# Patient Record
Sex: MALE | Race: ASIAN | URBAN - METROPOLITAN AREA
[De-identification: names, ages, dates, MRNs, and addresses within clinical notes are randomized per-mention and may not be internally consistent; named-entity substitution may affect disease eponyms.]

---

## 2017-03-08 DIAGNOSIS — E11.65 TYPE 2 DIABETES MELLITUS WITH HYPERGLYCEMIA, WITHOUT LONG-TERM CURRENT USE OF INSULIN (HCC): ICD-10-CM

## 2017-03-08 DIAGNOSIS — Z11.59 NEED FOR HEPATITIS C SCREENING TEST: ICD-10-CM

## 2017-03-09 LAB
EST. AVERAGE GLUCOSE BLD GHB EST-MCNC: 126 MG/DL
HBA1C MFR BLD: 6 % (ref 4.8–5.6)
HCV AB S/CO SERPL IA: <0.1 S/CO RATIO (ref 0–0.9)

## 2017-03-09 NOTE — PROGRESS NOTES
Your diabetes test is much improved. Your hepatitis C was normal.     Great job! Please eat healthy   and stay active at least 30 minutes a day. I will see you on March 14th.

## 2017-03-14 ENCOUNTER — OFFICE VISIT (OUTPATIENT)
Dept: INTERNAL MEDICINE CLINIC | Age: 62
End: 2017-03-14

## 2017-03-14 VITALS
BODY MASS INDEX: 23.18 KG/M2 | DIASTOLIC BLOOD PRESSURE: 78 MMHG | SYSTOLIC BLOOD PRESSURE: 116 MMHG | HEART RATE: 68 BPM | WEIGHT: 165.6 LBS | HEIGHT: 71 IN | OXYGEN SATURATION: 97 % | TEMPERATURE: 98 F | RESPIRATION RATE: 16 BRPM

## 2017-03-14 DIAGNOSIS — R07.89 MUSCULOSKELETAL CHEST PAIN: ICD-10-CM

## 2017-03-14 DIAGNOSIS — R79.89 ABNORMAL TSH: ICD-10-CM

## 2017-03-14 DIAGNOSIS — R20.2 NUMBNESS AND TINGLING OF RIGHT LEG: ICD-10-CM

## 2017-03-14 DIAGNOSIS — L85.3 DRY SKIN: ICD-10-CM

## 2017-03-14 DIAGNOSIS — R20.0 NUMBNESS AND TINGLING OF RIGHT LEG: ICD-10-CM

## 2017-03-14 DIAGNOSIS — E11.65 TYPE 2 DIABETES MELLITUS WITH HYPERGLYCEMIA, WITHOUT LONG-TERM CURRENT USE OF INSULIN (HCC): Primary | ICD-10-CM

## 2017-03-14 LAB
ALBUMIN UR QL STRIP: NORMAL MG/L
CREATININE, URINE POC: NORMAL MG/DL
MICROALBUMIN/CREAT RATIO POC: NORMAL MG/G

## 2017-03-14 RX ORDER — ATORVASTATIN CALCIUM 10 MG/1
10 TABLET, FILM COATED ORAL DAILY
Qty: 30 TAB | Refills: 6 | Status: SHIPPED | OUTPATIENT
Start: 2017-07-24 | End: 2017-11-07 | Stop reason: SDDI

## 2017-03-14 RX ORDER — METFORMIN HYDROCHLORIDE 500 MG/1
500 TABLET ORAL 2 TIMES DAILY WITH MEALS
Qty: 120 TAB | Refills: 6 | Status: SHIPPED | OUTPATIENT
Start: 2017-03-14 | End: 2017-11-07 | Stop reason: SDUPTHER

## 2017-03-14 NOTE — PROGRESS NOTES
Chief Complaint   Patient presents with    Other     lab results and states that he has lost weight and is feeling very tired.

## 2017-03-14 NOTE — MR AVS SNAPSHOT
Visit Information Date & Time Provider Department Dept. Phone Encounter #  
 3/14/2017  9:30 AM Daksha Dodson MD [facility] Internal Medicine [phone] [id number] Follow-up Instructions Return if symptoms worsen or fail to improve, for Chronic medical problems follow up. Upcoming Health Maintenance Date Due  
 FOOT EXAM Q1 11/9/1965 MICROALBUMIN Q1 11/9/1965 EYE EXAM RETINAL OR DILATED Q1 11/9/1965 HEMOGLOBIN A1C Q6M 9/8/2017 LIPID PANEL Q1 12/6/2017 FOBT Q 1 YEAR AGE 50-75 12/6/2017 DTaP/Tdap/Td series (2 - Td) 12/6/2026 Allergies as of 3/14/2017  Review Complete On: 3/14/2017 By: Esteban Urbina LPN No Known Allergies Current Immunizations  Never Reviewed No immunizations on file. Not reviewed this visit You Were Diagnosed With   
  
 Codes Comments Type 2 diabetes mellitus with hyperglycemia, without long-term current use of insulin (HCC)    -  Primary ICD-10-CM: E11.65 ICD-9-CM: 250.00, 790.29 Numbness and tingling of right leg     ICD-10-CM: R20.2 ICD-9-CM: 782.0 Abnormal TSH     ICD-10-CM: R79.89 ICD-9-CM: 790.6 Dry skin     ICD-10-CM: L85.3 ICD-9-CM: 701.1 Vitals BP Pulse Temp Resp Height(growth percentile) Weight(growth percentile) 116/78 (BP 1 Location: Left arm, BP Patient Position: Sitting) 68 98 °F (36.7 °C) (Oral) 16 5' 11\" (1.803 m) 165 lb 9.6 oz (75.1 kg) SpO2 BMI Smoking Status 97% 23.1 kg/m2 Never Smoker BMI and BSA Data Body Mass Index Body Surface Area  
 23.1 kg/m 2 1.94 m 2 Preferred Pharmacy Pharmacy Name Phone [facility] [address] , [place] [address] [phone] Your Updated Medication List  
  
   
This list is accurate as of: 3/14/17 10:14 AM.  Always use your most recent med list.  
  
  
  
  
 cholecalciferol 1,000 unit tablet Commonly known as:  VITAMIN D3 Take 1 Tab by mouth as needed. metFORMIN 500 mg tablet Commonly known as:  GLUCOPHAGE Take 1 Tab by mouth two (2) times daily (with meals). Indications: type 2 diabetes mellitus  
  
 multivitamin tablet Commonly known as:  ONE A DAY Take 1 Tab by mouth as needed. We Performed the Following  DIABETES FOOT EXAM [7 Custom] MICROALBUMIN, UR, RAND W/ MICROALBUMIN/CREA RATIO [id number] CPT(R)] REFERRAL TO OPHTHALMOLOGY [REF57 Custom] Comments:  
 Diabetic eye exam  
 TSH REFLEX TO T4 [[id number] Custom] Follow-up Instructions Return if symptoms worsen or fail to improve, for Chronic medical problems follow up. To-Do List   
 03/14/2017 Lab:  CBC W/O DIFF   
  
 03/14/2017 Lab:  HEMOGLOBIN A1C WITH EAG   
  
 03/14/2017 Lab:  LIPID PANEL   
  
 03/14/2017 Lab:  METABOLIC PANEL, COMPREHENSIVE Referral Information Referral ID Referred By Referred To  
  
 [id number] Fritz Guerra Not Available Visits Status Start Date End Date 1 New Request 3/14/17 3/14/18 If your referral has a status of pending review or denied, additional information will be sent to support the outcome of this decision. Patient Instructions Statins: Care Instructions Your Care Instructions Statins are medicines that lower your cholesterol and your risk for a heart attack and stroke. Cholesterol is a type of fat in your blood. If you have too much cholesterol, it can build up in blood vessels. This raises your risk of heart disease, heart attack, and stroke. Statins lower cholesterol by blocking how much cholesterol your body makes. This prevents cholesterol from building up in your blood vessels. This is called hardening of the arteries. It is the starting point for some heart and blood flow problems, such as heart disease. Statins may also reduce inflammation around the buildup (called plaque).  This can lower the risk that the plaque will break apart and lead to a heart attack or stroke. A heart-healthy lifestyle is important for lowering your risk whether you take statins or not. This includes eating healthy foods, being active, staying at a healthy weight, and not smoking. You must take statins regularly for them to work well. If you stop, your cholesterol and your risk will go back up. Examples of statins include: · Atorvastatin (Lipitor). · Lovastatin (Mevacor). · Pravastatin (Pravachol). · Simvastatin (Zocor). Statins interact with many medicines. So tell your doctor all of the other medicines that you take. These include prescription medicines, over-the-counter medicines, dietary supplements, and herbal products. Follow-up care is a key part of your treatment and safety. Be sure to make and go to all appointments, and call your doctor if you are having problems. It's also a good idea to know your test results and keep a list of the medicines you take. How can you care for yourself at home? · Take statins exactly as your doctor tells you. High cholesterol has no symptoms. So it is easy to forget to take the pills. Try to make a system that reminds you to take them. · Do not take two or more medicines at the same time unless the doctor told you to. Statins can interact with other medicines. · Always tell your doctor if you think you are having a side effect. If side effects are a problem with one medicine, a different one may be used. · Keep making the lifestyle changes your doctor suggests. Eat heart-healthy foods, be active, don't smoke, and stay at a healthy weight. · Talk to your doctor about avoiding grapefruit juice if you take statins. Grapefruit juice can raise the level of this medicine in your blood. This could increase side effects. When should you call for help? Watch closely for changes in your health, and be sure to contact your doctor if: 
· You have side effects of statins. These include: ¨ Fatigue. ¨ Upset stomach. ¨ Gas. ¨ Constipation. ¨ Pain or cramps in the belly. ¨ Muscle aches. · You have any new symptoms or side effects. Where can you learn more? Go to http://madonna-nazario.info/. Enter R358 in the search box to learn more about \"Statins: Care Instructions. \" Current as of: June 30, 2016 Content Version: 11.1 © 5674-6434 [employer]. Care instructions adapted under license by [employer] (which disclaims liability or warranty for this information). If you have questions about a medical condition or this instruction, always ask your healthcare professional. [place] [address] any warranty or liability for your use of this information. Neuropathic Pain: Care Instructions Your Care Instructions Neuropathic pain is caused by pressure on or damage to your nerves. It's often simply called nerve pain. Some people feel this type of pain all the time. For others, it comes and goes. Diabetes, shingles, or an injury can cause nerve pain. Many people say the pain feels sharp, burning, or stabbing. But some people feel it as a dull ache. In some cases, it makes your skin very sensitive. So touch, pressure, and other sensations that did not hurt before may now cause pain. It's important to know that this kind of pain is real and can affect your quality of life. It's also important to know that treatment can help. Treatment includes pain medicines, exercise, and physical therapy. Medicines can help reduce the number of pain signals that travel over the nerves. This can make the painful areas less sensitive. It can also help you sleep better and improve your mood. But medicines are only one part of successful treatment. Most people do best with more than one kind of treatment. Your doctor may recommend that you try cognitive-behavioral therapy and stress management.  Or, if needed, you may decide to try to quit smoking, lower your blood pressure, or better control blood sugar. These kinds of healthy changes can also make a difference. If you feel that your treatment is not working, talk to your doctor. And be sure to tell your doctor if you think you might be depressed or anxious. These are common problems that can also be treated. Follow-up care is a key part of your treatment and safety. Be sure to make and go to all appointments, and call your doctor if you are having problems. It's also a good idea to know your test results and keep a list of the medicines you take. How can you care for yourself at home? · Be safe with medicines. Read and follow all instructions on the label. ¨ If the doctor gave you a prescription medicine for pain, take it as prescribed. ¨ If you are not taking a prescription pain medicine, ask your doctor if you can take an over-the-counter medicine. · Save hard tasks for days when you have less pain. Follow a hard task with an easy task. And remember to take breaks. · Relax, and reduce stress. You may want to try deep breathing or meditation. These can help. · Keep moving. Gentle, daily exercise can help reduce pain. Your doctor or physical therapist can tell you what type of exercise is best for you. This may include walking, swimming, and stationary biking. It may also include stretches and range-of-motion exercises. · Try heat, cold packs, and massage. · Get enough sleep. Constant pain can make you more tired. If the pain makes it hard to sleep, talk with your doctor. · Think positively. Your thoughts can affect your pain. Do fun things to distract yourself from the pain. See a movie, read a book, listen to music, or spend time with a friend. · Keep a pain diary. Try to write down how strong your pain is and what it feels like. Also try to notice and write down how your moods, thoughts, sleep, activities, and medicine affect your pain.  These notes can help you and your doctor find the best ways to treat your pain. Reducing constipation caused by pain medicine Pain medicines often cause constipation. To reduce constipation: 
· Include fruits, vegetables, beans, and whole grains in your diet each day. These foods are high in fiber. · Drink plenty of fluids, enough so that your urine is light yellow or clear like water. If you have kidney, heart, or liver disease and have to limit fluids, talk with your doctor before you increase the amount of fluids you drink. · Get some exercise every day. Build up slowly to 30 to 60 minutes a day on 5 or more days of the week. · Take a fiber supplement, such as Citrucel or Metamucil, every day if needed. Read and follow all instructions on the label. · Schedule time each day for a bowel movement. Having a daily routine may help. Take your time and do not strain when having a bowel movement. · Ask your doctor about a laxative. The goal is to have one easy bowel movement every 1 to 2 days. Do not let constipation go untreated for more than 3 days. When should you call for help? Call your doctor now or seek immediate medical care if: 
· You feel sad, anxious, or hopeless for more than a few days. This could mean you are depressed. Depression is common in people who have a lot of pain. But it can be treated. · You have trouble with bowel movements, such as: 
¨ No bowel movement in 3 days. ¨ Blood in the anal area, in your stool, or on the toilet paper. ¨ Diarrhea for more than 24 hours. Watch closely for changes in your health, and be sure to contact your doctor if: 
· Your pain is getting worse. · You can't sleep because of pain. · You are very worried or anxious about your pain. · You have trouble taking your pain medicine. · You have any concerns about your pain medicine or its side effects. · You have vomiting or cramps for more than 2 hours. Where can you learn more? Go to http://madonna-nazario.info/. Enter C573 in the search box to learn more about \"Neuropathic Pain: Care Instructions. \" Current as of: February 19, 2016 Content Version: 11.1 © 0623-3499 [employer]. Care instructions adapted under license by [employer] (which disclaims liability or warranty for this information). If you have questions about a medical condition or this instruction, always ask your healthcare professional. [address] any warranty or liability for your use of this information. Diabetes Foot Health: Care Instructions Your Care Instructions When you have diabetes, your feet need extra care and attention. Diabetes can damage the nerve endings and blood vessels in your feet, making you less likely to notice when your feet are injured. Diabetes also limits your body's ability to fight infection and get blood to areas that need it. If you get a minor foot injury, it could become an ulcer or a serious infection. With good foot care, you can prevent most of these problems. Caring for your feet can be quick and easy. Most of the care can be done when you are bathing or getting ready for bed. Follow-up care is a key part of your treatment and safety. Be sure to make and go to all appointments, and call your doctor if you are having problems. Its also a good idea to know your test results and keep a list of the medicines you take. How can you care for yourself at home? · Keep your blood sugar close to normal by watching what and how much you eat, monitoring blood sugar, taking medicines if prescribed, and getting regular exercise. · Do not smoke. Smoking affects blood flow and can make foot problems worse. If you need help quitting, talk to your doctor about stop-smoking programs and medicines. These can increase your chances of quitting for good. · Eat a diet that is low in fats.  High fat intake can cause fat to build up in your blood vessels and decrease blood flow. · Inspect your feet daily for blisters, cuts, cracks, or sores. If you cannot see well, use a mirror or have someone help you. · Take care of your feet: 
[facility] your feet every day. Use warm (not hot) water. Check the water temperature with your wrists or other part of your body, not your feet. ¨ Dry your feet well. Pat them dry. Do not rub the skin on your feet too hard. Dry well between your toes. If the skin on your feet stays moist, bacteria or a fungus can grow, which can lead to infection. ¨ Keep your skin soft. Use moisturizing skin cream to keep the skin on your feet soft and prevent calluses and cracks. But do not put the cream between your toes, and stop using any cream that causes a rash. ¨ Clean underneath your toenails carefully. Do not use a sharp object to clean underneath your toenails. Use the blunt end of a nail file or other rounded tool. ¨ Trim and file your toenails straight across to prevent ingrown toenails. Use a nail clipper, not scissors. Use an emery board to smooth the edges. · Change socks daily. Socks without seams are best, because seams often rub the feet. You can find socks for people with diabetes from specialty catalogs. · Look inside your shoes every day for things like gravel or torn linings, which could cause blisters or sores. · Buy shoes that fit well: 
¨ Look for shoes that have plenty of space around the toes. This helps prevent bunions and blisters. ¨ Try on shoes while wearing the kind of socks you will usually wear with the shoes. ¨ Avoid plastic shoes. They may rub your feet and cause blisters. Good shoes should be made of materials that are flexible and breathable, such as leather or cloth. ¨ Break in new shoes slowly by wearing them for no more than an hour a day for several days. Take extra time to check your feet for red areas, blisters, or other problems after you wear new shoes. · Do not go barefoot. Do not wear sandals, and do not wear shoes with very thin soles. Thin soles are easy to puncture. They also do not protect your feet from hot pavement or cold weather. · Have your doctor check your feet during each visit. If you have a foot problem, see your doctor. Do not try to treat an early foot problem at home. Home remedies or treatments that you can buy without a prescription (such as corn removers) can be harmful. · Always get early treatment for foot problems. A minor irritation can lead to a major problem if not properly cared for early. When should you call for help? Call your doctor now or seek immediate medical care if: 
· You have a foot sore, an ulcer or break in the skin that is not healing after 4 days, bleeding corns or calluses, or an ingrown toenail. · You have blue or black areas, which can mean bruising or blood flow problems. · You have peeling skin or tiny blisters between your toes or cracking or oozing of the skin. · You have a fever for more than 24 hours and a foot sore. · You have new numbness or tingling in your feet that does not go away after you move your feet or change positions. · You have unexplained or unusual swelling of the foot or ankle. Watch closely for changes in your health, and be sure to contact your doctor if: 
· You cannot do proper foot care. Where can you learn more? Go to http://madonna-nazario.info/. Enter A739 in the search box to learn more about \"Diabetes Foot Health: Care Instructions. \" Current as of: May 23, 2016 Content Version: 11.1 © 7360-5815 [employer]. Care instructions adapted under license by [employer] (which disclaims liability or warranty for this information). If you have questions about a medical condition or this instruction, always ask your healthcare professional. [address] any warranty or liability for your use of this information. Learning About Metformin for Type 2 Diabetes Introduction Metformin (such as Glucophage) is a medicine used to treat type 2 diabetes. It helps keep blood sugar levels on target. You may have tried to eat a healthy diet, lose weight, and get more exercise to keep your blood sugar in your target range. If those things do not help, you may take a medicine called metformin. It helps your body use insulin. This can help you control your blood sugar. You might take it on its own or with other medicines. When taken on its own, metformin should not cause low blood sugar or weight gain. Example · Metformin (Glucophage) Possible side effects Common side effects include: · Short-term nausea. · Not feeling hungry. · Diarrhea. · Increased gas in your belly. · A metallic taste. You may have side effects or reactions not listed here. Check the information that comes with your medicine. What to know about taking this medicine · Metformin does not usually cause low blood sugar. But you may get a low blood sugar when you take metformin and you exercise hard, drink alcohol, or you do not eat enough food. · Sometimes metformin is combined with other diabetes medicine. Some of these can cause low blood sugar. · If you need a test that uses a dye or you need to have surgery, be sure to tell all of your doctors that you take metformin. You may have to stop taking it before and after the test or surgery. · Be safe with medicines. Take your medicines exactly as prescribed. Call your doctor if you think you are having a problem with your medicine. · Check with your doctor or pharmacist before you use any other medicines. This includes over-the-counter medicines. Make sure your doctor knows all of the medicines, vitamins, herbal products, and supplements you take. Taking some medicines together can cause problems. Where can you learn more? Go to http://madonna-nazario.info/. Enter Bryce Johnson in the search box to learn more about \"Learning About Metformin for Type 2 Diabetes. \" Current as of: August 3, 2016 Content Version: 11.1 © 8906-7145 [employer]. Care instructions adapted under license by [facility] (which disclaims liability or warranty for this information). If you have questions about a medical condition or this instruction, always ask your healthcare professional. [address] any warranty or liability for your use of this information. Introducing [facility]! Dear Carmen Walker: Thank you for requesting a [employer] account. Our records indicate that you already have an active [employer] account. You can access your account anytime at [email or web address] Did you know that you can access your hospital and ER discharge instructions at any time in [employer]? You can also review all of your test results from your hospital stay or ER visit. Additional Information If you have questions, please visit the Frequently Asked Questions section of the [employer] website at [email or web address] Remember, [employer] is NOT to be used for urgent needs. For medical emergencies, dial 911. Now available from your iPhone and Android! Please provide this summary of care documentation to your next provider. Your primary care clinician is listed as Dayo Stovall. If you have any questions after today's visit, please call [phone].

## 2017-03-14 NOTE — PATIENT INSTRUCTIONS
Statins: Care Instructions  Your Care Instructions  Statins are medicines that lower your cholesterol and your risk for a heart attack and stroke. Cholesterol is a type of fat in your blood. If you have too much cholesterol, it can build up in blood vessels. This raises your risk of heart disease, heart attack, and stroke. Statins lower cholesterol by blocking how much cholesterol your body makes. This prevents cholesterol from building up in your blood vessels. This is called hardening of the arteries. It is the starting point for some heart and blood flow problems, such as heart disease. Statins may also reduce inflammation around the buildup (called plaque). This can lower the risk that the plaque will break apart and lead to a heart attack or stroke. A heart-healthy lifestyle is important for lowering your risk whether you take statins or not. This includes eating healthy foods, being active, staying at a healthy weight, and not smoking. You must take statins regularly for them to work well. If you stop, your cholesterol and your risk will go back up. Examples of statins include:  · Atorvastatin (Lipitor). · Lovastatin (Mevacor). · Pravastatin (Pravachol). · Simvastatin (Zocor). Statins interact with many medicines. So tell your doctor all of the other medicines that you take. These include prescription medicines, over-the-counter medicines, dietary supplements, and herbal products. Follow-up care is a key part of your treatment and safety. Be sure to make and go to all appointments, and call your doctor if you are having problems. It's also a good idea to know your test results and keep a list of the medicines you take. How can you care for yourself at home? · Take statins exactly as your doctor tells you. High cholesterol has no symptoms. So it is easy to forget to take the pills. Try to make a system that reminds you to take them.   · Do not take two or more medicines at the same time unless the doctor told you to. Statins can interact with other medicines. · Always tell your doctor if you think you are having a side effect. If side effects are a problem with one medicine, a different one may be used. · Keep making the lifestyle changes your doctor suggests. Eat heart-healthy foods, be active, don't smoke, and stay at a healthy weight. · Talk to your doctor about avoiding grapefruit juice if you take statins. Grapefruit juice can raise the level of this medicine in your blood. This could increase side effects. When should you call for help? Watch closely for changes in your health, and be sure to contact your doctor if:  · You have side effects of statins. These include:  ¨ Fatigue. ¨ Upset stomach. ¨ Gas. ¨ Constipation. ¨ Pain or cramps in the belly. ¨ Muscle aches. · You have any new symptoms or side effects. Where can you learn more? Go to http://madonna-nazario.info/. Enter R358 in the search box to learn more about \"Statins: Care Instructions. \"  Current as of: June 30, 2016  Content Version: 11.1  © 6709-7941 [employer]. Care instructions adapted under license by [employer] (which disclaims liability or warranty for this information). If you have questions about a medical condition or this instruction, always ask your healthcare professional. [place] [address] any warranty or liability for your use of this information. Neuropathic Pain: Care Instructions  Your Care Instructions  Neuropathic pain is caused by pressure on or damage to your nerves. It's often simply called nerve pain. Some people feel this type of pain all the time. For others, it comes and goes. Diabetes, shingles, or an injury can cause nerve pain. Many people say the pain feels sharp, burning, or stabbing. But some people feel it as a dull ache. In some cases, it makes your skin very sensitive.  So touch, pressure, and other sensations that did not hurt before may now cause pain. It's important to know that this kind of pain is real and can affect your quality of life. It's also important to know that treatment can help. Treatment includes pain medicines, exercise, and physical therapy. Medicines can help reduce the number of pain signals that travel over the nerves. This can make the painful areas less sensitive. It can also help you sleep better and improve your mood. But medicines are only one part of successful treatment. Most people do best with more than one kind of treatment. Your doctor may recommend that you try cognitive-behavioral therapy and stress management. Or, if needed, you may decide to try to quit smoking, lower your blood pressure, or better control blood sugar. These kinds of healthy changes can also make a difference. If you feel that your treatment is not working, talk to your doctor. And be sure to tell your doctor if you think you might be depressed or anxious. These are common problems that can also be treated. Follow-up care is a key part of your treatment and safety. Be sure to make and go to all appointments, and call your doctor if you are having problems. It's also a good idea to know your test results and keep a list of the medicines you take. How can you care for yourself at home? · Be safe with medicines. Read and follow all instructions on the label. ¨ If the doctor gave you a prescription medicine for pain, take it as prescribed. ¨ If you are not taking a prescription pain medicine, ask your doctor if you can take an over-the-counter medicine. · Save hard tasks for days when you have less pain. Follow a hard task with an easy task. And remember to take breaks. · Relax, and reduce stress. You may want to try deep breathing or meditation. These can help. · Keep moving. Gentle, daily exercise can help reduce pain. Your doctor or physical therapist can tell you what type of exercise is best for you.  This may include walking, swimming, and stationary biking. It may also include stretches and range-of-motion exercises. · Try heat, cold packs, and massage. · Get enough sleep. Constant pain can make you more tired. If the pain makes it hard to sleep, talk with your doctor. · Think positively. Your thoughts can affect your pain. Do fun things to distract yourself from the pain. See a movie, read a book, listen to music, or spend time with a friend. · Keep a pain diary. Try to write down how strong your pain is and what it feels like. Also try to notice and write down how your moods, thoughts, sleep, activities, and medicine affect your pain. These notes can help you and your doctor find the best ways to treat your pain. Reducing constipation caused by pain medicine  Pain medicines often cause constipation. To reduce constipation:  · Include fruits, vegetables, beans, and whole grains in your diet each day. These foods are high in fiber. · Drink plenty of fluids, enough so that your urine is light yellow or clear like water. If you have kidney, heart, or liver disease and have to limit fluids, talk with your doctor before you increase the amount of fluids you drink. · Get some exercise every day. Build up slowly to 30 to 60 minutes a day on 5 or more days of the week. · Take a fiber supplement, such as Citrucel or Metamucil, every day if needed. Read and follow all instructions on the label. · Schedule time each day for a bowel movement. Having a daily routine may help. Take your time and do not strain when having a bowel movement. · Ask your doctor about a laxative. The goal is to have one easy bowel movement every 1 to 2 days. Do not let constipation go untreated for more than 3 days. When should you call for help? Call your doctor now or seek immediate medical care if:  · You feel sad, anxious, or hopeless for more than a few days. This could mean you are depressed.  Depression is common in people who have a lot of pain. But it can be treated. · You have trouble with bowel movements, such as:  ¨ No bowel movement in 3 days. ¨ Blood in the anal area, in your stool, or on the toilet paper. ¨ Diarrhea for more than 24 hours. Watch closely for changes in your health, and be sure to contact your doctor if:  · Your pain is getting worse. · You can't sleep because of pain. · You are very worried or anxious about your pain. · You have trouble taking your pain medicine. · You have any concerns about your pain medicine or its side effects. · You have vomiting or cramps for more than 2 hours. Where can you learn more? Go to http://madonna-nazario.info/. Enter R708 in the search box to learn more about \"Neuropathic Pain: Care Instructions. \"  Current as of: February 19, 2016  Content Version: 11.1  © 5818-5651 [employer]. Care instructions adapted under license by [employer] (which disclaims liability or warranty for this information). If you have questions about a medical condition or this instruction, always ask your healthcare professional. [place] [address] any warranty or liability for your use of this information. Diabetes Foot Health: Care Instructions  Your Care Instructions    When you have diabetes, your feet need extra care and attention. Diabetes can damage the nerve endings and blood vessels in your feet, making you less likely to notice when your feet are injured. Diabetes also limits your body's ability to fight infection and get blood to areas that need it. If you get a minor foot injury, it could become an ulcer or a serious infection. With good foot care, you can prevent most of these problems. Caring for your feet can be quick and easy. Most of the care can be done when you are bathing or getting ready for bed. Follow-up care is a key part of your treatment and safety.  Be sure to make and go to all appointments, and call your doctor if you are having problems. Its also a good idea to know your test results and keep a list of the medicines you take. How can you care for yourself at home? · Keep your blood sugar close to normal by watching what and how much you eat, monitoring blood sugar, taking medicines if prescribed, and getting regular exercise. · Do not smoke. Smoking affects blood flow and can make foot problems worse. If you need help quitting, talk to your doctor about stop-smoking programs and medicines. These can increase your chances of quitting for good. · Eat a diet that is low in fats. High fat intake can cause fat to build up in your blood vessels and decrease blood flow. · Inspect your feet daily for blisters, cuts, cracks, or sores. If you cannot see well, use a mirror or have someone help you. · Take care of your feet:  [facility] your feet every day. Use warm (not hot) water. Check the water temperature with your wrists or other part of your body, not your feet. ¨ Dry your feet well. Pat them dry. Do not rub the skin on your feet too hard. Dry well between your toes. If the skin on your feet stays moist, bacteria or a fungus can grow, which can lead to infection. ¨ Keep your skin soft. Use moisturizing skin cream to keep the skin on your feet soft and prevent calluses and cracks. But do not put the cream between your toes, and stop using any cream that causes a rash. ¨ Clean underneath your toenails carefully. Do not use a sharp object to clean underneath your toenails. Use the blunt end of a nail file or other rounded tool. ¨ Trim and file your toenails straight across to prevent ingrown toenails. Use a nail clipper, not scissors. Use an emery board to smooth the edges. · Change socks daily. Socks without seams are best, because seams often rub the feet. You can find socks for people with diabetes from specialty catalogs.   · Look inside your shoes every day for things like gravel or torn linings, which could cause blisters or sores.  · Buy shoes that fit well:  ¨ Look for shoes that have plenty of space around the toes. This helps prevent bunions and blisters. ¨ Try on shoes while wearing the kind of socks you will usually wear with the shoes. ¨ Avoid plastic shoes. They may rub your feet and cause blisters. Good shoes should be made of materials that are flexible and breathable, such as leather or cloth. ¨ Break in new shoes slowly by wearing them for no more than an hour a day for several days. Take extra time to check your feet for red areas, blisters, or other problems after you wear new shoes. · Do not go barefoot. Do not wear sandals, and do not wear shoes with very thin soles. Thin soles are easy to puncture. They also do not protect your feet from hot pavement or cold weather. · Have your doctor check your feet during each visit. If you have a foot problem, see your doctor. Do not try to treat an early foot problem at home. Home remedies or treatments that you can buy without a prescription (such as corn removers) can be harmful. · Always get early treatment for foot problems. A minor irritation can lead to a major problem if not properly cared for early. When should you call for help? Call your doctor now or seek immediate medical care if:  · You have a foot sore, an ulcer or break in the skin that is not healing after 4 days, bleeding corns or calluses, or an ingrown toenail. · You have blue or black areas, which can mean bruising or blood flow problems. · You have peeling skin or tiny blisters between your toes or cracking or oozing of the skin. · You have a fever for more than 24 hours and a foot sore. · You have new numbness or tingling in your feet that does not go away after you move your feet or change positions. · You have unexplained or unusual swelling of the foot or ankle. Watch closely for changes in your health, and be sure to contact your doctor if:  · You cannot do proper foot care.   Where can you learn more? Go to http://madonna-nazario.info/. Enter A739 in the search box to learn more about \"Diabetes Foot Health: Care Instructions. \"  Current as of: May 23, 2016  Content Version: 11.1  © 5509-9834 [employer]. Care instructions adapted under license by [employer] (which disclaims liability or warranty for this information). If you have questions about a medical condition or this instruction, always ask your healthcare professional. [address] any warranty or liability for your use of this information. Learning About Metformin for Type 2 Diabetes  Introduction  Metformin (such as Glucophage) is a medicine used to treat type 2 diabetes. It helps keep blood sugar levels on target. You may have tried to eat a healthy diet, lose weight, and get more exercise to keep your blood sugar in your target range. If those things do not help, you may take a medicine called metformin. It helps your body use insulin. This can help you control your blood sugar. You might take it on its own or with other medicines. When taken on its own, metformin should not cause low blood sugar or weight gain. Example  · Metformin (Glucophage)  Possible side effects  Common side effects include:  · Short-term nausea. · Not feeling hungry. · Diarrhea. · Increased gas in your belly. · A metallic taste. You may have side effects or reactions not listed here. Check the information that comes with your medicine. What to know about taking this medicine  · Metformin does not usually cause low blood sugar. But you may get a low blood sugar when you take metformin and you exercise hard, drink alcohol, or you do not eat enough food. · Sometimes metformin is combined with other diabetes medicine. Some of these can cause low blood sugar. · If you need a test that uses a dye or you need to have surgery, be sure to tell all of your doctors that you take metformin.  You may have to stop taking it before and after the test or surgery. · Be safe with medicines. Take your medicines exactly as prescribed. Call your doctor if you think you are having a problem with your medicine. · Check with your doctor or pharmacist before you use any other medicines. This includes over-the-counter medicines. Make sure your doctor knows all of the medicines, vitamins, herbal products, and supplements you take. Taking some medicines together can cause problems. Where can you learn more? Go to http://madonna-nazario.info/. Enter Darwin Fuentes in the search box to learn more about \"Learning About Metformin for Type 2 Diabetes. \"  Current as of: August 3, 2016  Content Version: 11.1  © 6709-5463 [employer]. Care instructions adapted under license by [employer] (which disclaims liability or warranty for this information). If you have questions about a medical condition or this instruction, always ask your healthcare professional. [address] any warranty or liability for your use of this information.

## 2017-03-14 NOTE — PROGRESS NOTES
This note will not be viewable in [address] Ellis Magana is a  64 y.o. male presents for visit. Chief Complaint   Patient presents with    Other     lab results and states that he has lost weight and is feeling very tired. HPI wife is present during our visit. Patient presents for diabetes checkup he feels better overall has lost 5 pounds. His numbness in his leg has improved and he wants to continue the Metformin. I informed him that metformin can cause weight loss. He needs to increase his calorie intake and stay active. Regarding the thyroid test.   I informed him that the thyroid abnormalities can cause him to lose weight and be tired. Last time we spoke he did not want to pursue with anyMedications or interventions. He declined getting blood work at this time for thyroids as he recently had blood work. I informed him that repeating the blood work for his thyroids would make a difference if he wants to pursue treatment options. He said he still wants to think about it. I spoke to him about starting a statin as recommended because of his diagnoses diabetes and follow-up with ophthalmology for retinal exam.    We discussed health maintenance he has declined all immunizations. Patient decided at the end of the  visit, he wants to pursue with taking a statin. He does not want to check blood work in 6 weeks however he is okay with checking blood work in 6 months for his diabetes follow-up and he agrees to start the statin 6 weeks before his 6 month follow-up. He does not like to come to the doctor frequently. His risk and benefits were discussed including monitoring liver tests when he start statin therapy. He agreed to a low dose statin. Patient has left-sided chest wall pain when he moves his hand backwards. This only happens intermediately and currently he does not have any pain. This pain does not happen when he is exercising.   There is no diaphoreses no substernal chest pain no palpitations. Does not happen during movement with other activities such as writing. And only happens when he moves it all the way backwards and currently he is unable to have the pain present. He denies any nausea or vomiting associated with that pain. His wife has not noted any changes such as shortness of breath diaphoreses or altered mental status either. Review of Systems   Constitutional: Negative for chills, diaphoresis, fever, malaise/fatigue and weight loss. Eyes: Negative for blurred vision. Respiratory: Negative for cough. Cardiovascular: Negative for chest pain, palpitations and claudication. Gastrointestinal: Negative for constipation, diarrhea, heartburn, nausea and vomiting. Genitourinary: Negative for dysuria, frequency, hematuria and urgency. Musculoskeletal: Negative for joint pain and myalgias. Skin: Negative for rash. Neurological: Negative for dizziness, tingling, tremors, sensory change, seizures, loss of consciousness and weakness. Endo/Heme/Allergies: Does not bruise/bleed easily. Psychiatric/Behavioral: Negative for depression. Patient Active Problem List    Diagnosis Date Noted    Type 2 diabetes mellitus with hyperglycemia, without long-term current use of insulin ([facility] [address] 2016    Numbness and tingling of right leg 2016    Vitamin D deficiency 2016    Hepatitis 2016    Screening for colorectal cancer 2016    Constipation 2016    Abnormal TSH 2016    Adenomatous polyp of colon 2012     Past Medical History:   Diagnosis Date    Gastritis       History reviewed. No pertinent surgical history. Social History   Substance Use Topics    Smoking status: Never Smoker    Smokeless tobacco: Not on file    Alcohol use No      Social History     Social History Narrative    Mother  54 from an accident    Father  at 80 from an infection probably pulmonary.     2 Brother with diabetes And another brother with lung cancer at the age of 54        Patient has a total of 4 brothers. He is . They have 2 children. Lives with his spouse    Retired. Patient drinks alcohol with beer and wine 2 drinks per week. Lifetime nontobacco use. Denies illicit drug use. No  service. History reviewed. No pertinent family history. Prior to Admission medications    Medication Sig Start Date End Date Taking? Authorizing Provider   metFORMIN (GLUCOPHAGE) 500 mg tablet Take 1 Tab by mouth two (2) times daily (with meals). Indications: type 2 diabetes mellitus 3/14/17  Yes Hany Soto MD   atorvastatin (LIPITOR) 10 mg tablet Take 1 Tab by mouth daily. 7/24/17  Yes Hany Soto MD   multivitamin (ONE A DAY) tablet Take 1 Tab by mouth as needed. 12/6/16  Yes Hany Soto MD   cholecalciferol (VITAMIN D3) 1,000 unit tablet Take 1 Tab by mouth as needed. 12/6/16  Yes Hany Soto MD      No Known Allergies       Visit Vitals    /78 (BP 1 Location: Left arm, BP Patient Position: Sitting)    Pulse 68    Temp 98 °F (36.7 °C) (Oral)    Resp 16    Ht 5' 11\" (1.803 m)    Wt 165 lb 9.6 oz (75.1 kg)    SpO2 97%    BMI 23.1 kg/m2     Physical Exam   Constitutional: He is oriented to person, place, and time. He appears well-developed and well-nourished. No distress. HENT:   Head: Normocephalic and atraumatic. Right Ear: External ear normal.   Left Ear: External ear normal.   Nose: Nose normal.   Mouth/Throat: Mucous membranes are normal.   Eyes: Conjunctivae are normal. No scleral icterus. Neck: Normal range of motion. Neck supple. No JVD present. No tracheal deviation present. No thyromegaly present. Cardiovascular: Normal rate and normal heart sounds. Exam reveals no gallop and no friction rub. No murmur heard. Pulmonary/Chest: Effort normal and breath sounds normal. No stridor. No respiratory distress. He has no wheezes. He has no rales.  He exhibits no tenderness. Abdominal: Soft. Bowel sounds are normal. He exhibits no distension. There is no tenderness. There is no rebound and no guarding. Musculoskeletal: He exhibits no edema, tenderness or deformity. Arms:       Right foot: Normal. There is normal range of motion, no tenderness, no bony tenderness, no swelling, normal capillary refill, no crepitus, no deformity and no laceration. Left foot: Normal. There is normal range of motion, no tenderness, no bony tenderness, no swelling, normal capillary refill, no crepitus, no deformity and no laceration. PULSES INTACT B/L LE  VERY DRY FLAKY SKIN ANGE BOTTOM OF THE FEET. Lymphadenopathy:     He has no cervical adenopathy. Neurological: He is alert and oriented to person, place, and time. Skin: Skin is warm and dry. He is not diaphoretic. Psychiatric: He has a normal mood and affect. No results found for this or any previous visit (from the past 24 hour(s)). ASSESSMENT AND PLAN:      ICD-10-CM ICD-9-CM    1. Type 2 diabetes mellitus with hyperglycemia, without long-term current use of insulin ([facility]) E11.65 250.00 REFERRAL TO OPHTHALMOLOGY     790.29  DIABETES FOOT EXAM      MICROALBUMIN, UR, RAND W/ MICROALBUMIN/CREA RATIO      HEMOGLOBIN A1C WITH EAG      LIPID PANEL      METABOLIC PANEL, COMPREHENSIVE      CBC W/O DIFF      AMB POC URINE, MICROALBUMIN, SEMIQUANT (3 RESULTS)      CANCELED: TSH 3RD GENERATION   2. Musculoskeletal chest pain R07.89 786.59    3. Numbness and tingling of right leg R20.2 782.0    4. Abnormal TSH R79.89 790.6 TSH REFLEX TO T4      CANCELED: TSH 3RD GENERATION   5.  Dry skin L85.3 701.1      Orders Placed This Encounter    MICROALBUMIN, UR, RAND W/ MICROALBUMIN/CREA RATIO    HEMOGLOBIN A1C WITH EAG     Standing Status:   Future     Standing Expiration Date:   9/14/2017    LIPID PANEL     Standing Status:   Future     Standing Expiration Date:   7/89/1074    METABOLIC PANEL, COMPREHENSIVE     Standing Status:   Future     Standing Expiration Date:   9/14/2017    CBC W/O DIFF     Standing Status:   Future     Standing Expiration Date:   9/14/2017    TSH REFLEX TO T4    REFERRAL TO OPHTHALMOLOGY     Referral Priority:   Routine     Referral Type:   Consultation     Referral Reason:   Specialty Services Required    AMB POC URINE, MICROALBUMIN, SEMIQUANT (3 RESULTS)    HM DIABETES FOOT EXAM    metFORMIN (GLUCOPHAGE) 500 mg tablet     Sig: Take 1 Tab by mouth two (2) times daily (with meals). Indications: type 2 diabetes mellitus     Dispense:  120 Tab     Refill:  6    atorvastatin (LIPITOR) 10 mg tablet     Sig: Take 1 Tab by mouth daily. Dispense:  30 Tab     Refill:  6     lab results and schedule of future lab studies reviewed with patient  reviewed diet, exercise and weight control  cardiovascular risk and specific lipid/LDL goals reviewed  specific diabetic recommendations: home glucose monitoring emphasized, all medications, side effects and compliance discussed carefully, foot care discussed and Podiatry visits discussed, annual eye examinations at Ophthalmology discussed, glycohemoglobin and other lab monitoring discussed, long term diabetic complications discussed and labs immediately prior to next visit  use of aspirin to prevent MI and TIA's discussed  Patient advised to have more frequent meals. 4-6 SMALL MEALS. Follow-up Disposition:  Return in about 6 months (around 9/14/2017), or if symptoms worsen or fail to improve, for Chronic medical problems follow up. Disclaimer:  Advised him to call back or return to office if symptoms worsen/change/persist.  Discussed expected course/resolution/complications of diagnosis in detail with patient. Medication risks/benefits/alternatives discussed with patient. He was given an after visit summary which includes diagnoses, current medications, & vitals.      Discussed patient instructions and advised to read to all patient instructions regarding care. He expressed understanding with the diagnosis and plan.

## 2017-04-11 DIAGNOSIS — Z00.00 ROUTINE HEALTH MAINTENANCE: Primary | ICD-10-CM

## 2017-11-02 DIAGNOSIS — R20.2 NUMBNESS AND TINGLING OF RIGHT LEG: ICD-10-CM

## 2017-11-02 DIAGNOSIS — R20.0 NUMBNESS AND TINGLING OF RIGHT LEG: ICD-10-CM

## 2017-11-02 DIAGNOSIS — E11.65 TYPE 2 DIABETES MELLITUS WITH HYPERGLYCEMIA, WITHOUT LONG-TERM CURRENT USE OF INSULIN (HCC): ICD-10-CM

## 2017-11-02 DIAGNOSIS — R79.89 ABNORMAL TSH: Primary | ICD-10-CM

## 2017-11-02 DIAGNOSIS — E78.5 DYSLIPIDEMIA: ICD-10-CM

## 2017-11-02 DIAGNOSIS — K75.9 HEPATITIS: ICD-10-CM

## 2017-11-03 LAB
BUN SERPL-MCNC: 13 MG/DL (ref 8–27)
BUN/CREAT SERPL: 16 (ref 10–24)
CALCIUM SERPL-MCNC: 9 MG/DL (ref 8.6–10.2)
CHLORIDE SERPL-SCNC: 97 MMOL/L (ref 96–106)
CHOLEST SERPL-MCNC: 154 MG/DL (ref 100–199)
CO2 SERPL-SCNC: 27 MMOL/L (ref 18–29)
CREAT SERPL-MCNC: 0.79 MG/DL (ref 0.76–1.27)
EST. AVERAGE GLUCOSE BLD GHB EST-MCNC: 126 MG/DL
GFR SERPLBLD CREATININE-BSD FMLA CKD-EPI: 112 ML/MIN/1.73
GFR SERPLBLD CREATININE-BSD FMLA CKD-EPI: 97 ML/MIN/1.73
GLUCOSE SERPL-MCNC: 110 MG/DL (ref 65–99)
HBA1C MFR BLD: 6 % (ref 4.8–5.6)
HDLC SERPL-MCNC: 58 MG/DL
INTERPRETATION, 910389: NORMAL
LDLC SERPL CALC-MCNC: 79 MG/DL (ref 0–99)
Lab: NORMAL
POTASSIUM SERPL-SCNC: 4.2 MMOL/L (ref 3.5–5.2)
SODIUM SERPL-SCNC: 139 MMOL/L (ref 134–144)
TRIGL SERPL-MCNC: 86 MG/DL (ref 0–149)
TSH SERPL DL<=0.005 MIU/L-ACNC: 0.6 UIU/ML (ref 0.45–4.5)
VLDLC SERPL CALC-MCNC: 17 MG/DL (ref 5–40)

## 2017-11-05 NOTE — PROGRESS NOTES
Mr. David Figueredo,     Hemoglobin A1c is good. Cholesterol is great. TSH thyroid test is normal.     Will see you on 11/7 for refills and further management.      Kindly,

## 2017-11-07 ENCOUNTER — OFFICE VISIT (OUTPATIENT)
Dept: INTERNAL MEDICINE CLINIC | Age: 62
End: 2017-11-07

## 2017-11-07 VITALS
DIASTOLIC BLOOD PRESSURE: 82 MMHG | SYSTOLIC BLOOD PRESSURE: 102 MMHG | RESPIRATION RATE: 16 BRPM | TEMPERATURE: 97.9 F | HEIGHT: 71 IN | OXYGEN SATURATION: 98 % | WEIGHT: 160.2 LBS | HEART RATE: 75 BPM | BODY MASS INDEX: 22.43 KG/M2

## 2017-11-07 DIAGNOSIS — Z12.12 SCREENING FOR COLORECTAL CANCER: ICD-10-CM

## 2017-11-07 DIAGNOSIS — R20.2 NUMBNESS AND TINGLING OF RIGHT LEG: ICD-10-CM

## 2017-11-07 DIAGNOSIS — R20.0 NUMBNESS AND TINGLING OF RIGHT LEG: ICD-10-CM

## 2017-11-07 DIAGNOSIS — Z12.11 SCREENING FOR COLORECTAL CANCER: ICD-10-CM

## 2017-11-07 DIAGNOSIS — L98.9 SKIN LESIONS: ICD-10-CM

## 2017-11-07 DIAGNOSIS — E11.65 TYPE 2 DIABETES MELLITUS WITH HYPERGLYCEMIA, WITHOUT LONG-TERM CURRENT USE OF INSULIN (HCC): Primary | ICD-10-CM
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
## 2017-11-07 RX ORDER — GUAIFENESIN 100 MG/5ML
81 LIQUID (ML) ORAL DAILY
COMMUNITY
Start: 2017-11-07

## 2017-11-07 RX ORDER — POLYETHYLENE GLYCOL 3350 17 G/17G
17 POWDER, FOR SOLUTION ORAL DAILY
COMMUNITY
Start: 2017-11-07 | End: 2018-02-20 | Stop reason: SDUPTHER

## 2017-11-07 RX ORDER — METFORMIN HYDROCHLORIDE 500 MG/1
500 TABLET ORAL 2 TIMES DAILY WITH MEALS
Qty: 180 TAB | Refills: 1 | Status: SHIPPED | OUTPATIENT
Start: 2017-11-07 | End: 2018-02-20 | Stop reason: SDUPTHER

## 2017-11-07 NOTE — PATIENT INSTRUCTIONS
Learning About Sun Damage and Your Skin  How does the sun affect your skin? Being out in the sun can be good for you. Sunlight can brighten your mood and help you feel healthier. But getting too much of the ultraviolet (UV) rays in sunlight or from a tanning bed can harm your skin. The damage may include:  · Skin changes like early wrinkling, sagging skin, and age spots. · Skin cancer. The lighter your skin is, the more easily it can be damaged by the sun. But everyone can benefit from protecting their skin from the sun, regardless of skin color. The amount of skin damage you can get from sunlight depends on:  · The time of day. You are more likely to get a sunburn in the middle of the day. You might think that exposure to the sun isn't a problem on cloudy days, but the sun's UV rays can still pass through clouds. · Whether you are near reflective surfaces, such as water, white sand, concrete, snow, or ice. All of these reflect the sun's UV rays. · The season of the year. The intense sunlight of summer days can cause more skin damage than the sunlight in other seasons. How can you protect your skin from the sun? Most sunburn and skin cancer can be prevented. Use the following tips to protect your skin. Avoid exposure to the sun  · Try to spend less time in the sun from 10 in the morning to 4 in the afternoon. Find shade if you need to be outdoors. · Wear clothing that blocks the sun. This can be a wide-brimmed hat that covers your neck, ears, eyes, and scalp. It can also include loose-fitting, tightly-woven clothes that cover your arms and legs. · Wear sunglasses that block UV rays. Use sunscreen  · Always wear sunscreen on exposed skin. Make sure to use a broad-spectrum sunscreen that has a sun protection factor (SPF) of 30 or higher. Use it every day, even when it is cloudy. · Apply sunscreen at least 30 minutes before you go out in the sun.  Put on more every 2 to 3 hours while you are in the sun and after you sweat a lot or swim. · Take extra care to protect your skin when you're near water, at higher elevations, or in tropical climates. · Use a broad-spectrum lip balm or cream that has SPF of 30 to protect your lips from getting sunburned. Where can you learn more? Go to http://madonna-nazario.info/. Enter R455 in the search box to learn more about \"Learning About Sun Damage and Your Skin. \"  Current as of: October 13, 2016  Content Version: 11.4  © 8565-4406 [employer]. Care instructions adapted under license by [employer] (which disclaims liability or warranty for this information). If you have questions about a medical condition or this instruction, always ask your healthcare professional. [address] any warranty or liability for your use of this information. Polyethylene Glycol 3350 (By mouth)   Polyethylene Glycol (bka-ty-MRO-i-yuli GLYE-kol)  Treats occasional constipation. Brand Name(s): Equate ClearLax, Smart Balloon, [employer]Now [facility] Clear Lax, Healthylax Polyethylene Glycol 3350, Leader ClearLax, MiraLAX, , Purelax, [employer] Laxative, [employer]mark Clearmax, [employer]Care ClearLax, [facility] Polyethylene Glycol 3350   There may be other brand names for this medicine. When This Medicine Should Not Be Used: You should not use this medicine if you have had an allergic reaction to polyethylene glycol, or if you have signs of a bowel obstruction (nausea, vomiting, stomach pain or bloating). How to Use This Medicine:   Powder, Liquid, Packet  · Your doctor will tell you how much of this medicine to take and how often. Do not take more medicine or take it more often than your doctor tells you to. This medicine is not for long-term use. · Always dissolve the powder in a full glass (8 ounces) of water, juice, soda, coffee, or tea before swallowing it.  If you need to measure your medicine, use a marked measuring cup or spoon. · Measure the oral liquid medicine with a marked measuring spoon, oral syringe, or medicine cup. · It may take 2 days or longer for this medicine to help you have a bowel movement. If a dose is missed:   · If you miss a dose or forget to take your medicine, take it as soon as you can. If it is almost time for your next dose, wait until then to take the medicine and skip the missed dose. · Do not use extra medicine to make up for a missed dose. How to Store and Dispose of This Medicine:   · Store the medicine at room temperature in a closed container, away from heat, moisture, and direct light. · Keep all medicine out of the reach of children and never share your medicine with anyone. Drugs and Foods to Avoid:      Ask your doctor or pharmacist before using any other medicine, including over-the-counter medicines, vitamins, and herbal products. Warnings While Using This Medicine:   · Make sure your doctor knows if you are pregnant or breastfeeding. · Do not use this medicine longer than 2 weeks unless your doctor has told you to. Possible Side Effects While Using This Medicine:   Call your doctor right away if you notice any of these side effects:  · Allergic reaction: Itching or hives, swelling in face or hands, swelling or tingling in the mouth or throat, tightness in chest, trouble breathing. · Severe stomach pain, bloating, vomiting, or diarrhea. If you notice these less serious side effects, talk with your doctor:   · Mild cramps, bloating, diarrhea, or gas. If you notice other side effects that you think are caused by this medicine, tell your doctor. Call your doctor for medical advice about side effects. You may report side effects to FDA at 1-009-FDA-8330  © 2017 [address] Information is for End User's use only and may not be sold, redistributed or otherwise used for commercial purposes. The above information is an  only.  It is not intended as medical advice for individual conditions or treatments. Talk to your doctor, nurse or pharmacist before following any medical regimen to see if it is safe and effective for you. Polyethylene Glycol 3350 (MiraLAX, Healthylax Polyethylene Glycol 3350, GaviLAX, Leader ClearLax) - (By mouth)   Why this medicine is used:   Treats occasional constipation. Contact a nurse or doctor right away if you have:  · Severe stomach pain, bloating, vomiting, diarrhea     Common side effects:  · Mild cramps, bloating, diarrhea, gas  © 2017 [address] Information is for End User's use only and may not be sold, redistributed or otherwise used for commercial purposes. Constipation: Care Instructions  Your Care Instructions    Constipation means that you have a hard time passing stools (bowel movements). People pass stools from 3 times a day to once every 3 days. What is normal for you may be different. Constipation may occur with pain in the rectum and cramping. The pain may get worse when you try to pass stools. Sometimes there are small amounts of bright red blood on toilet paper or the surface of stools. This is because of enlarged veins near the rectum (hemorrhoids). A few changes in your diet and lifestyle may help you avoid ongoing constipation. Your doctor may also prescribe medicine to help loosen your stool. Some medicines can cause constipation. These include pain medicines and antidepressants. Tell your doctor about all the medicines you take. Your doctor may want to make a medicine change to ease your symptoms. Follow-up care is a key part of your treatment and safety. Be sure to make and go to all appointments, and call your doctor if you are having problems. It's also a good idea to know your test results and keep a list of the medicines you take. How can you care for yourself at home? · Drink plenty of fluids, enough so that your urine is light yellow or clear like water.  If you have kidney, heart, or liver disease and have to limit fluids, talk with your doctor before you increase the amount of fluids you drink. · Include high-fiber foods in your diet each day. These include fruits, vegetables, beans, and whole grains. · Get at least 30 minutes of exercise on most days of the week. Walking is a good choice. You also may want to do other activities, such as running, swimming, cycling, or playing tennis or team sports. · Take a fiber supplement, such as Citrucel or Metamucil, every day. Read and follow all instructions on the label. · Schedule time each day for a bowel movement. A daily routine may help. Take your time having your bowel movement. · Support your feet with a small step stool when you sit on the toilet. This helps flex your hips and places your pelvis in a squatting position. · Your doctor may recommend an over-the-counter laxative to relieve your constipation. Examples are Milk of Magnesia and MiraLax. Read and follow all instructions on the label. Do not use laxatives on a long-term basis. When should you call for help? Call your doctor now or seek immediate medical care if:  ? · You have new or worse belly pain. ? · You have new or worse nausea or vomiting. ? · You have blood in your stools. ? Watch closely for changes in your health, and be sure to contact your doctor if:  ? · Your constipation is getting worse. ? · You do not get better as expected. Where can you learn more? Go to http://madonna-nazario.info/. Enter 21 [phone] in the search box to learn more about \"Constipation: Care Instructions. \"  Current as of: March 20, 2017  Content Version: 11.4  © 0835-5374 [employer]. Care instructions adapted under license by [employer] (which disclaims liability or warranty for this information).  If you have questions about a medical condition or this instruction, always ask your healthcare professional. Renée Salomon disclaims any warranty or liability for your use of this information.

## 2017-11-07 NOTE — MR AVS SNAPSHOT
Visit Information Date & Time Provider Department Dept. Phone Encounter #  
 11/7/2017  8:45 AM Burney Gilford, MD [facility] Internal Medicine [phone] [id number] Follow-up Instructions Return in about 4 months (around 3/7/2018) for Chronic medical problems follow up. Upcoming Health Maintenance Date Due FOBT Q 1 YEAR AGE 50-75 12/6/2017 FOOT EXAM Q1 3/14/2018 MICROALBUMIN Q1 3/14/2018 EYE EXAM RETINAL OR DILATED Q1 3/17/2018 HEMOGLOBIN A1C Q6M 5/2/2018 LIPID PANEL Q1 11/2/2018 DTaP/Tdap/Td series (2 - Td) 12/6/2026 Allergies as of 11/7/2017  Review Complete On: 11/7/2017 By: Albertina Leon LPN No Known Allergies Current Immunizations  Never Reviewed No immunizations on file. Not reviewed this visit You Were Diagnosed With   
  
 Codes Comments Type 2 diabetes mellitus with hyperglycemia, without long-term current use of insulin (HCC)    -  Primary ICD-10-CM: E11.65 ICD-9-CM: 250.00, 790.29 Numbness and tingling of right leg     ICD-10-CM: R20.0, R20.2 ICD-9-CM: 782.0 Screening for colorectal cancer     ICD-10-CM: Z12.11, Z12.12 
ICD-9-CM: V76.51 Skin lesions     ICD-10-CM: L98.9 ICD-9-CM: 709.9 Excess sun exposure     ICD-10-CM: X32. Daune Koyanagi ICD-9-CM: V15.89 Vitals BP Pulse Temp Resp Height(growth percentile) Weight(growth percentile) 102/82 (BP 1 Location: Left arm, BP Patient Position: Sitting) 75 97.9 °F (36.6 °C) (Oral) 16 5' 11\" (1.803 m) 160 lb 3.2 oz (72.7 kg) SpO2 BMI Smoking Status 98% 22.34 kg/m2 Never Smoker BMI and BSA Data Body Mass Index Body Surface Area  
 22.34 kg/m 2 1.91 m 2 Preferred Pharmacy Pharmacy Name Phone [facility] [address] [place] [address] [phone] Your Updated Medication List  
  
   
This list is accurate as of: 11/7/17  9:13 AM.  Always use your most recent med list.  
  
  
  
  
 aspirin 81 mg chewable tablet Take 1 Tab by mouth daily. cholecalciferol 1,000 unit tablet Commonly known as:  VITAMIN D3 Take 1 Tab by mouth as needed. metFORMIN 500 mg tablet Commonly known as:  GLUCOPHAGE Take 1 Tab by mouth two (2) times daily (with meals). Indications: type 2 diabetes mellitus  
  
 multivitamin tablet Commonly known as:  ONE A DAY Take 1 Tab by mouth as needed. polyethylene glycol 17 gram packet Commonly known as:  Lelan Situ Take 1 Packet by mouth daily. Prescriptions Sent to Pharmacy Refills  
 metFORMIN (GLUCOPHAGE) 500 mg tablet 1 Sig: Take 1 Tab by mouth two (2) times daily (with meals). Indications: type 2 diabetes mellitus Class: Normal  
 Pharmacy: [facility] [address] , [address] #: [phone] Route: Oral  
  
We Performed the Following OCCULT BLOOD, IMMUNOASSAY (FIT) [id number] CPT(R)] REFERRAL TO DERMATOLOGY [REF19 Custom] Comments:  
 Skin exam 
Evaluate/treat/orders/ and instruct for associated diagnoses. Follow-up Instructions Return in about 4 months (around 3/7/2018) for Chronic medical problems follow up. Referral Information Referral ID Referred By Referred To  
  
 [id number] Rianna ALLRED7 VISHAL Arcos MD   
   [address] Phone: [phone] Fax: [phone] Visits Status Start Date End Date 1 New Request 11/7/17 11/7/18 If your referral has a status of pending review or denied, additional information will be sent to support the outcome of this decision. Patient Instructions Learning About Sun Damage and Your Skin How does the sun affect your skin? Being out in the sun can be good for you. Sunlight can brighten your mood and help you feel healthier. But getting too much of the ultraviolet (UV) rays in sunlight or from a tanning bed can harm your skin.  The damage may include: 
· Skin changes like early wrinkling, sagging skin, and age spots. · Skin cancer. The lighter your skin is, the more easily it can be damaged by the sun. But everyone can benefit from protecting their skin from the sun, regardless of skin color. The amount of skin damage you can get from sunlight depends on: · The time of day. You are more likely to get a sunburn in the middle of the day. You might think that exposure to the sun isn't a problem on cloudy days, but the sun's UV rays can still pass through clouds. · Whether you are near reflective surfaces, such as water, white sand, concrete, snow, or ice. All of these reflect the sun's UV rays. · The season of the year. The intense sunlight of summer days can cause more skin damage than the sunlight in other seasons. How can you protect your skin from the sun? Most sunburn and skin cancer can be prevented. Use the following tips to protect your skin. Avoid exposure to the sun · Try to spend less time in the sun from 10 in the morning to 4 in the afternoon. Find shade if you need to be outdoors. · Wear clothing that blocks the sun. This can be a wide-brimmed hat that covers your neck, ears, eyes, and scalp. It can also include loose-fitting, tightly-woven clothes that cover your arms and legs. · Wear sunglasses that block UV rays. Use sunscreen · Always wear sunscreen on exposed skin. Make sure to use a broad-spectrum sunscreen that has a sun protection factor (SPF) of 30 or higher. Use it every day, even when it is cloudy. · Apply sunscreen at least 30 minutes before you go out in the sun. Put on more every 2 to 3 hours while you are in the sun and after you sweat a lot or swim. · Take extra care to protect your skin when you're near water, at higher elevations, or in tropical climates. · Use a broad-spectrum lip balm or cream that has SPF of 30 to protect your lips from getting sunburned. Where can you learn more? Go to http://madonna-nazario.info/. Enter R455 in the search box to learn more about \"Learning About Sun Damage and Your Skin. \" Current as of: October 13, 2016 Content Version: 11.4 © 3003-0953 Herz[employer]. Care instructions adapted under license by [employer] (which disclaims liability or warranty for this information). If you have questions about a medical condition or this instruction, always ask your healthcare professional. [address] any warranty or liability for your use of this information. Polyethylene Glycol 3350 (By mouth) Polyethylene Glycol (ije-zh-YHN-i-yuli GLYE-kol) Treats occasional constipation. Brand Name(s): Equate ClearLax, Zainab Comer, [facility] Clear Lax, Healthylax Polyethylene Glycol 3350, Leader ClearLax, MiraLAX, , Purelax, [employer] Laxative, Sunmark Clearmax, TopCare ClearLax, [facility] Polyethylene Glycol 3350 There may be other brand names for this medicine. When This Medicine Should Not Be Used: You should not use this medicine if you have had an allergic reaction to polyethylene glycol, or if you have signs of a bowel obstruction (nausea, vomiting, stomach pain or bloating). How to Use This Medicine:  
Powder, Liquid, Packet · Your doctor will tell you how much of this medicine to take and how often. Do not take more medicine or take it more often than your doctor tells you to. This medicine is not for long-term use. · Always dissolve the powder in a full glass (8 ounces) of water, juice, soda, coffee, or tea before swallowing it. If you need to measure your medicine, use a marked measuring cup or spoon. · Measure the oral liquid medicine with a marked measuring spoon, oral syringe, or medicine cup. · It may take 2 days or longer for this medicine to help you have a bowel movement. If a dose is missed: · If you miss a dose or forget to take your medicine, take it as soon as you can. If it is almost time for your next dose, wait until then to take the medicine and skip the missed dose. · Do not use extra medicine to make up for a missed dose. How to Store and Dispose of This Medicine: · Store the medicine at room temperature in a closed container, away from heat, moisture, and direct light. · Keep all medicine out of the reach of children and never share your medicine with anyone. Drugs and Foods to Avoid: Ask your doctor or pharmacist before using any other medicine, including over-the-counter medicines, vitamins, and herbal products. Warnings While Using This Medicine: · Make sure your doctor knows if you are pregnant or breastfeeding. · Do not use this medicine longer than 2 weeks unless your doctor has told you to. Possible Side Effects While Using This Medicine:  
Call your doctor right away if you notice any of these side effects: · Allergic reaction: Itching or hives, swelling in face or hands, swelling or tingling in the mouth or throat, tightness in chest, trouble breathing. · Severe stomach pain, bloating, vomiting, or diarrhea. If you notice these less serious side effects, talk with your doctor: · Mild cramps, bloating, diarrhea, or gas. If you notice other side effects that you think are caused by this medicine, tell your doctor. Call your doctor for medical advice about side effects. You may report side effects to FDA at 5-710-FDA-2557 © 2017 [address] Information is for End User's use only and may not be sold, redistributed or otherwise used for commercial purposes. The above information is an  only. It is not intended as medical advice for individual conditions or treatments. Talk to your doctor, nurse or pharmacist before following any medical regimen to see if it is safe and effective for you. Polyethylene Glycol 3350 (MiraLAX, Healthylax Polyethylene Glycol 3350, GaviLAX, Leader ClearLax) - (By mouth) Why this medicine is used:  
Treats occasional constipation. Contact a nurse or doctor right away if you have: · Severe stomach pain, bloating, vomiting, diarrhea Common side effects: · Mild cramps, bloating, diarrhea, gas © 2017 [facility] Information is for End User's use only and may not be sold, redistributed or otherwise used for commercial purposes. Constipation: Care Instructions Your Care Instructions Constipation means that you have a hard time passing stools (bowel movements). People pass stools from 3 times a day to once every 3 days. What is normal for you may be different. Constipation may occur with pain in the rectum and cramping. The pain may get worse when you try to pass stools. Sometimes there are small amounts of bright red blood on toilet paper or the surface of stools. This is because of enlarged veins near the rectum (hemorrhoids). A few changes in your diet and lifestyle may help you avoid ongoing constipation. Your doctor may also prescribe medicine to help loosen your stool. Some medicines can cause constipation. These include pain medicines and antidepressants. Tell your doctor about all the medicines you take. Your doctor may want to make a medicine change to ease your symptoms. Follow-up care is a key part of your treatment and safety. Be sure to make and go to all appointments, and call your doctor if you are having problems. It's also a good idea to know your test results and keep a list of the medicines you take. How can you care for yourself at home? · Drink plenty of fluids, enough so that your urine is light yellow or clear like water. If you have kidney, heart, or liver disease and have to limit fluids, talk with your doctor before you increase the amount of fluids you drink. · Include high-fiber foods in your diet each day. These include fruits, vegetables, beans, and whole grains. · Get at least 30 minutes of exercise on most days of the week. Walking is a good choice. You also may want to do other activities, such as running, swimming, cycling, or playing tennis or team sports. · Take a fiber supplement, such as Citrucel or Metamucil, every day. Read and follow all instructions on the label. · Schedule time each day for a bowel movement. A daily routine may help. Take your time having your bowel movement. · Support your feet with a small step stool when you sit on the toilet. This helps flex your hips and places your pelvis in a squatting position. · Your doctor may recommend an over-the-counter laxative to relieve your constipation. Examples are Milk of Magnesia and MiraLax. Read and follow all instructions on the label. Do not use laxatives on a long-term basis. When should you call for help? Call your doctor now or seek immediate medical care if: 
? · You have new or worse belly pain. ? · You have new or worse nausea or vomiting. ? · You have blood in your stools. ? Watch closely for changes in your health, and be sure to contact your doctor if: 
? · Your constipation is getting worse. ? · You do not get better as expected. Where can you learn more? Go to http://madonna-nazario.info/. Enter [phone] in the search box to learn more about \"Constipation: Care Instructions. \" Current as of: March 20, 2017 Content Version: 11.4 © 8567-7132 [employer]ital[employer]x. Care instructions adapted under license by [employer] (which disclaims liability or warranty for this information). If you have questions about a medical condition or this instruction, always ask your healthcare professional. [place] [address] any warranty or liability for your use of this information. Introducing [facility]! Dear Marian Mendoza: Thank you for requesting a Kimble account. Our records indicate that you already have an active Kimble account. You can access your account anytime at [email or web address] Did you know that you can access your hospital and ER discharge instructions at any time in Kimble? You can also review all of your test results from your hospital stay or ER visit. Additional Information If you have questions, please visit the Frequently Asked Questions section of the Kimble website at [email or web address] Remember, Kimble is NOT to be used for urgent needs. For medical emergencies, dial 911. Now available from your iPhone and Android! Please provide this summary of care documentation to your next provider. Your primary care clinician is listed as Cheryle Rhodes. If you have any questions after today's visit, please call [phone].

## 2017-11-07 NOTE — ASSESSMENT & PLAN NOTE
Well Controlled, based on history, physical exam and review of pertinent labs, studies and medications; meds reconciled; changes to treatment plan as per orders, lifestyle modifications recommended, medication compliance emphasized. Key Antihyperglycemic Medications             metFORMIN (GLUCOPHAGE) 500 mg tablet  (Taking) Take 1 Tab by mouth two (2) times daily (with meals). Indications: type 2 diabetes mellitus        Other Key Diabetic Medications     Patient is on no other key diabetic meds.         Lab Results   Component Value Date/Time    Hemoglobin A1c 6.0 11/02/2017 09:17 AM    Glucose 110 11/02/2017 09:17 AM    Creatinine 0.79 11/02/2017 09:17 AM    Microalbumin/creat ratio (POC) <30mg 03/14/2017 09:23 AM    Cholesterol, total 154 11/02/2017 09:17 AM    HDL Cholesterol 58 11/02/2017 09:17 AM    LDL, calculated 79 11/02/2017 09:17 AM    Triglyceride 86 11/02/2017 09:17 AM     Diabetic Foot and Eye Exam HM Status   Topic Date Due    Diabetic Foot Care  03/14/2018    Eye Exam  03/17/2018

## 2017-11-07 NOTE — PROGRESS NOTES
This note will not be viewable in [address] Benitez Love is a  64 y.o. male presents for visit. Chief Complaint   Patient presents with    Follow-up     diabetic follow up. HPI  Diagnoses and all orders for this visit:    1. Type 2 diabetes mellitus with hyperglycemia, without long-term current use of insulin (HCC)  Assessment & Plan:  Well Controlled, based on history, physical exam and review of pertinent labs, studies and medications; meds reconciled; changes to treatment plan as per orders, lifestyle modifications recommended, medication compliance emphasized. Key Antihyperglycemic Medications             metFORMIN (GLUCOPHAGE) 500 mg tablet  (Taking) Take 1 Tab by mouth two (2) times daily (with meals). Indications: type 2 diabetes mellitus        Other Key Diabetic Medications     Patient is on no other key diabetic meds. Lab Results   Component Value Date/Time    Hemoglobin A1c 6.0 11/02/2017 09:17 AM    Glucose 110 11/02/2017 09:17 AM    Creatinine 0.79 11/02/2017 09:17 AM    Microalbumin/creat ratio (POC) <30mg 03/14/2017 09:23 AM    Cholesterol, total 154 11/02/2017 09:17 AM    HDL Cholesterol 58 11/02/2017 09:17 AM    LDL, calculated 79 11/02/2017 09:17 AM    Triglyceride 86 11/02/2017 09:17 AM     Diabetic Foot and Eye Exam HM Status   Topic Date Due    Diabetic Foot Care  03/14/2018    Eye Exam  03/17/2018         2. Numbness and tingling of right leg improved    3. Screening for colorectal cancer  -     OCCULT BLOOD, IMMUNOASSAY (FIT)  Decline flu vaccine     Consultation twice a month. declines colonscopy And GI consult for constipation     Review of Systems   Constitutional: Negative for chills, diaphoresis, fever and malaise/fatigue. HENT: Negative for congestion, ear pain, hearing loss and sinus pain. Eyes: Negative for blurred vision. Respiratory: Negative for cough, hemoptysis, sputum production, shortness of breath and wheezing.     Cardiovascular: Negative for chest pain, palpitations, orthopnea, claudication, leg swelling and PND. Gastrointestinal: Positive for constipation. Negative for abdominal pain, blood in stool, diarrhea, melena, nausea and vomiting. Genitourinary: Negative for dysuria and urgency. Musculoskeletal: Negative for back pain, falls, joint pain, myalgias and neck pain. Skin: Negative for itching and rash. Neurological: Negative for dizziness, tingling, tremors, sensory change, speech change, focal weakness, seizures, loss of consciousness, weakness and headaches. Endo/Heme/Allergies: Does not bruise/bleed easily. Psychiatric/Behavioral: Negative for depression, hallucinations, substance abuse and suicidal ideas. The patient is nervous/anxious (about doctors and blood tests). The patient does not have insomnia. Visit Vitals    /82 (BP 1 Location: Left arm, BP Patient Position: Sitting)    Pulse 75    Temp 97.9 °F (36.6 °C) (Oral)    Resp 16    Ht 5' 11\" (1.803 m)    Wt 160 lb 3.2 oz (72.7 kg)    SpO2 98%    BMI 22.34 kg/m2     Physical Exam   Constitutional: He is oriented to person, place, and time. He appears well-developed and well-nourished. No distress. HENT:   Head: Normocephalic and atraumatic. Eyes: Conjunctivae and EOM are normal.   Neck: Neck supple. No JVD present. No tracheal deviation present. Cardiovascular: Normal rate, regular rhythm, normal heart sounds and intact distal pulses. No murmur heard. Pulmonary/Chest: Effort normal and breath sounds normal. No stridor. No respiratory distress. Abdominal: Soft. Bowel sounds are normal. He exhibits no distension. There is no tenderness. Musculoskeletal: He exhibits no edema. Neurological: He is alert and oriented to person, place, and time. Coordination normal.   Skin: Skin is warm and dry. He is not diaphoretic. No cyanosis. Nails show no clubbing.         Psychiatric: His behavior is normal. Judgment and thought content normal.       No results found for this or any previous visit (from the past 24 hour(s)). Patient Active Problem List    Diagnosis Date Noted    Type 2 diabetes mellitus with hyperglycemia, without long-term current use of insulin ([facility] 12/13/2016    Numbness and tingling of right leg 12/06/2016    Vitamin D deficiency 12/06/2016    Hepatitis 12/06/2016    Screening for colorectal cancer 12/06/2016    Constipation 12/06/2016    Abnormal TSH 12/06/2016    Adenomatous polyp of colon 06/13/2012         ASSESSMENT AND PLAN:      ICD-10-CM ICD-9-CM   1. Type 2 diabetes mellitus with hyperglycemia, without long-term current use of insulin (HCC) E11.65 250.00     790.29   2. Numbness and tingling of right leg R20.0 782.0    R20.2    3. Screening for colorectal cancer Z12.11 V76.51    Z12.12    4. Skin lesions L98.9 709.9   5. Excess sun exposure X32. Hemant Hashimoto V15.89     Orders Placed This Encounter    OCCULT BLOOD, IMMUNOASSAY (FIT)    REFERRAL TO DERMATOLOGY     Referral Priority:   Routine     Referral Type:   Consultation     Referral Reason:   Specialty Services Required     Referred to Provider:   Yanick Rojas MD     Requested Specialty:   Dermatology    metFORMIN (GLUCOPHAGE) 500 mg tablet     Sig: Take 1 Tab by mouth two (2) times daily (with meals). Indications: type 2 diabetes mellitus     Dispense:  180 Tab     Refill:  1    aspirin 81 mg chewable tablet     Sig: Take 1 Tab by mouth daily.  polyethylene glycol (MIRALAX) 17 gram packet     Sig: Take 1 Packet by mouth daily. Diagnoses and all orders for this visit:    1. Type 2 diabetes mellitus with hyperglycemia, without long-term current use of insulin (HCC)  Assessment & Plan:  Well Controlled, based on history, physical exam and review of pertinent labs, studies and medications; meds reconciled; changes to treatment plan as per orders, lifestyle modifications recommended, medication compliance emphasized.   Key Antihyperglycemic Medications metFORMIN (GLUCOPHAGE) 500 mg tablet  (Taking) Take 1 Tab by mouth two (2) times daily (with meals). Indications: type 2 diabetes mellitus        Other Key Diabetic Medications     Patient is on no other key diabetic meds. Lab Results   Component Value Date/Time    Hemoglobin A1c 6.0 11/02/2017 09:17 AM    Glucose 110 11/02/2017 09:17 AM    Creatinine 0.79 11/02/2017 09:17 AM    Microalbumin/creat ratio (POC) <30mg 03/14/2017 09:23 AM    Cholesterol, total 154 11/02/2017 09:17 AM    HDL Cholesterol 58 11/02/2017 09:17 AM    LDL, calculated 79 11/02/2017 09:17 AM    Triglyceride 86 11/02/2017 09:17 AM     Diabetic Foot and Eye Exam HM Status   Topic Date Due    Diabetic Foot Care  03/14/2018    Eye Exam  03/17/2018         2. Numbness and tingling of right leg resolving     3. Screening for colorectal cancer  -     OCCULT BLOOD, IMMUNOASSAY (FIT)    4. Skin lesions  -     REFERRAL TO DERMATOLOGY    5. Excess sun exposure  -     REFERRAL TO DERMATOLOGY    TSH is now normal     Other orders  -     metFORMIN (GLUCOPHAGE) 500 mg tablet; Take 1 Tab by mouth two (2) times daily (with meals). Indications: type 2 diabetes mellitus      lab results and schedule of future lab studies reviewed with patient  reviewed diet, exercise and weight control  cardiovascular risk and specific lipid/LDL goals reviewed  reviewed medications and side effects in detail  use of aspirin to prevent MI and TIA's discussed      Declined statin treatment   Agrees to start aspirin   Agrees to wear a hat and have a skin follow up     Follow-up Disposition:  Return in about 4 months (around 3/7/2018) for Chronic medical problems follow up. Disclaimer:  Advised him to call back or return to office if symptoms worsen/change/persist.  Discussed expected course/resolution/complications of diagnosis in detail with patient. Medication risks/benefits/alternatives discussed with patient.   He was given an after visit summary which includes diagnoses, current medications, & vitals. Discussed patient instructions and advised to read to all patient instructions regarding care. He expressed understanding with the diagnosis and plan.

## 2017-11-15 LAB — HEMOCCULT STL QL IA: NEGATIVE

## 2018-01-17 ENCOUNTER — OFFICE VISIT (OUTPATIENT)
Dept: DERMATOLOGY | Facility: AMBULATORY SURGERY CENTER | Age: 63
End: 2018-01-17

## 2018-01-17 VITALS
WEIGHT: 160 LBS | SYSTOLIC BLOOD PRESSURE: 114 MMHG | OXYGEN SATURATION: 97 % | TEMPERATURE: 98.2 F | RESPIRATION RATE: 16 BRPM | DIASTOLIC BLOOD PRESSURE: 70 MMHG | HEART RATE: 65 BPM | BODY MASS INDEX: 22.32 KG/M2

## 2018-01-17 DIAGNOSIS — D23.9 DERMATOFIBROMA: ICD-10-CM

## 2018-01-17 DIAGNOSIS — L82.1 OTHER SEBORRHEIC KERATOSIS: ICD-10-CM

## 2018-01-17 DIAGNOSIS — L81.4 LENTIGINES: ICD-10-CM

## 2018-01-17 DIAGNOSIS — D23.9 DILATED PORE OF WINER: Primary | ICD-10-CM

## 2018-01-17 DIAGNOSIS — Q82.5 BIRTHMARK: ICD-10-CM

## 2018-01-17 DIAGNOSIS — Z12.83 SCREENING FOR MALIGNANT NEOPLASM OF SKIN: ICD-10-CM

## 2018-01-17 DIAGNOSIS — D18.01 CHERRY ANGIOMA: ICD-10-CM

## 2018-01-17 DIAGNOSIS — D22.9 MULTIPLE BENIGN NEVI: ICD-10-CM

## 2018-01-17 DIAGNOSIS — L91.8 SKIN TAG: ICD-10-CM

## 2018-01-17 NOTE — MR AVS SNAPSHOT
[address] [facility] [address] 
[phone] Patient: Berhane Calderon MRN: [id number] Visit Information Date & Time Provider Department Dept. Phone Encounter #  
 1/17/2018  9:00 AM [facility] Kaye [id number] [phone] Upcoming Health Maintenance Date Due  
 FOOT EXAM Q1 3/14/2018 MICROALBUMIN Q1 3/14/2018 EYE EXAM RETINAL OR DILATED Q1 3/17/2018 HEMOGLOBIN A1C Q6M 5/2/2018 LIPID PANEL Q1 11/2/2018 FOBT Q 1 YEAR AGE 50-75 11/8/2018 DTaP/Tdap/Td series (2 - Td) 12/6/2026 Allergies as of 1/17/2018  Review Complete On: 1/17/2018 By: Shi North LPN No Known Allergies Current Immunizations  Never Reviewed No immunizations on file. Not reviewed this visit Vitals BP Pulse Temp Resp Weight(growth percentile) SpO2  
 114/70 (BP 1 Location: Left leg) 65 98.2 °F (36.8 °C) (Oral) 16 160 lb (72.6 kg) 97% BMI Smoking Status 22.32 kg/m2 Never Smoker BMI and BSA Data Body Mass Index Body Surface Area  
 22.32 kg/m 2 1.91 m 2 Preferred Pharmacy Pharmacy Name Phone [facility] [phone] Your Updated Medication List  
  
   
This list is accurate as of: 1/17/18  9:14 AM.  Always use your most recent med list.  
  
  
  
  
 aspirin 81 mg chewable tablet Take 1 Tab by mouth daily. cholecalciferol 1,000 unit tablet Commonly known as:  VITAMIN D3 Take 1 Tab by mouth as needed. metFORMIN 500 mg tablet Commonly known as:  GLUCOPHAGE Take 1 Tab by mouth two (2) times daily (with meals). Indications: type 2 diabetes mellitus  
  
 multivitamin tablet Commonly known as:  ONE A DAY Take 1 Tab by mouth as needed. polyethylene glycol 17 gram packet Commonly known as:  [place] Michael Take 1 Packet by mouth daily. Patient Instructions Self Skin Exam and Sunscreens Early detection and treatment is essential in the treatment of all forms of skin cancer. If caught early, all forms of skin cancer are curable. In addition to your regular visits, you should perform a monthly skin examination. Over time, you become familiar with what is normally found on your skin and can identify new or suspicious spots. One of the screening tools you can use to assess your skin is to follow the ABCDEs: 
 
A= Asymmetry (One half is unlike the other half) B= Border (An irregular, scalloped or poorly defined edge) C= Color (Is varied from one area to another, has shades of tan, brown/ black,       white, red or blue) D= Diameter (Spots larger than 6mm or a pencil eraser) E= Evolving (New spots or one that is changing in size, shape, or color) A follow- up interval will be customized based on your history of skin cancer or level of skin damage and risk factors. In any case, if you notice a suspicious or new spot, an appointment should be arranged between regular visits. Everyone should use sunscreen and sun-safe practices, which is especially important for those with a personal or family history of skin cancer. Suggestions for this include: 1. Use daily moisturizers containing SPF 30 or higher. 2. Wear long sleeve clothing with UPF ratings and a broad-brimmed hat. 3. Apply sunscreen with SPF 30 or higher to all sun exposed areas if you are going to be in the sun. A broad spectrum UVA/ UVB sunscreen is best.  Dont forget to REAPPLY every two hours or more often if swimming or sweating! 4. Avoid outside activities during peak sun hours, especially in the summer (10am- 2pm). 5. DO NOT use tanning beds. Using sunscreen and sun-safe practices can help reduce the likelihood of developing skin cancer or additional skin cancers in those previously diagnosed. Introducing [facility]! Dear Jeromy Cortez: Thank you for requesting a [facility] account. Our records indicate that you already have an active [facility] account. You can access your account anytime at [email or web address] Did you know that you can access your hospital and ER discharge instructions at any time in [facility]? You can also review all of your test results from your hospital stay or ER visit. Additional Information If you have questions, please visit the Frequently Asked Questions section of the [facility] website at [email or web address] Remember, [facility] is NOT to be used for urgent needs. For medical emergencies, dial 911. Now available from your iPhone and Android! Please provide this summary of care documentation to your next provider. Your primary care clinician is listed as Alysia Thakur. If you have any questions after today's visit, please call [phone].

## 2018-01-17 NOTE — PROGRESS NOTES
Written by Duke Sanders, as dictated by Eyad Costello Found, [address]. Name: Becka Kaye       Age: 58 y.o. Date: 2018    Chief Complaint:   Chief Complaint   Patient presents with    Skin Exam     Face, Right arm       Subjective:    HPI  Mr. Becka Kaye is a 58 y.o. male who presents as a new patient to [facility] for a skin exam.  The patient was referred for this evaluation by Dr. Jeremy Stevens. The patient has not had a skin exam in the past and the patient does have current complaints related to his skin. He has lesions Dr. Jeremy Stevens would like evaluated. He reports skin growths over the last few years on his face and arms. He reports dry skin on his feet with h/o DM. The patient's pertinent skin history includes: No personal or family history of skin cancer    ROS: Constitutional: Negative. Dermatological : negative    Social History     Social History    Marital status:      Spouse name: N/A    Number of children: N/A    Years of education: N/A     Occupational History    Not on file. Social History Main Topics    Smoking status: Never Smoker    Smokeless tobacco: Never Used    Alcohol use No    Drug use: No    Sexual activity: Yes     Other Topics Concern    Not on file     Social History Narrative    Mother  54 from an accident    Father  at 80 from an infection probably pulmonary. 2 Brother with diabetes    And another brother with lung cancer at the age of 54        Patient has a total of 4 brothers. He is . They have 2 children. Lives with his spouse    Retired. Patient drinks alcohol with beer and wine 2 drinks per week. Lifetime nontobacco use. Denies illicit drug use. No  service.            Family History   Problem Relation Age of Onset    No Known Problems Mother     No Known Problems Father        Past Medical History:   Diagnosis Date    Diabetes ([address]     Gastritis     Sun-damaged skin        History reviewed. No pertinent surgical history. Current Outpatient Prescriptions   Medication Sig Dispense Refill    metFORMIN (GLUCOPHAGE) 500 mg tablet Take 1 Tab by mouth two (2) times daily (with meals). Indications: type 2 diabetes mellitus 180 Tab 1    aspirin 81 mg chewable tablet Take 1 Tab by mouth daily.  multivitamin (ONE A DAY) tablet Take 1 Tab by mouth as needed.  cholecalciferol (VITAMIN D3) 1,000 unit tablet Take 1 Tab by mouth as needed.  polyethylene glycol (MIRALAX) 17 gram packet Take 1 Packet by mouth daily. No Known Allergies      Objective:    Visit Vitals    /70 (BP 1 Location: Left leg)    Pulse 65    Temp 98.2 °F (36.8 °C) (Oral)    Resp 16    Wt 160 lb (72.6 kg)    SpO2 97%    BMI 22.32 kg/m2       Yuliet Ricardo is a 58 y.o. male who appears well and in no distress. He is awake, alert, and oriented. There is no preauricular, submandibular, or cervical lymphadenopathy. A skin examination was performed including his scalp, face (including eyelid), ears, neck, chest, back, upper extremities (including digits/nails), lower extremities (excluding the right foot), breasts. He has a dilated pore on his scalp. He has a vascular birthmark on his posterior superior neck. There are scattered waxy macules and keratotic papules consistent with seborrheic keratoses. There are darkly pigmented intradermal and brown junctional nevi, no concerning features. He has scattered red papules consistent with cherry angiomas. He has a cyst-like structure on his right shoulder with punctum and no inflammation. He has skin tags on his neck. There are lentigines on sun exposed areas. He has a dermatofibroma on his left anterior shin. Assessment/Plan:    1. Dilated pore, scalp. The diagnosis was discussed. The patient was reassured. 2. Vascular birthmark, posterior superior neck. The diagnosis was discussed. The patient was reassured.      3. Seborrheic keratoses. The diagnosis was reviewed and the patient was reassured that no treatment is needed for these benign lesions. 4. Normal nevi. The diagnosis of normal nevi was reviewed. I discussed sun protection, sunscreen use, the warning signs of skin cancer, the need for self-skin examinations, and the need for regular practitioner exams every 1 year. The patient should follow up sooner as needed if new, changing, or symptomatic skin lesions arise. 5. Cherry angiomas. The diagnosis was reviewed and the patient was reassured that no treatment is needed for these benign lesions. 6. Cyst, right shoulder. The diagnosis was discussed. The patient was reassured. 7. Skin tags, neck. The diagnosis was discussed. The patient was reassured. 8. Solar lentigos. The diagnosis and relationship to sun exposure was reviewed. Sun protection advised. 9. Dermatofibroma, left anterior shin. The diagnosis was reviewed and the patient was reassured that no treatment is needed for these benign conditions at this time. The patient should follow up should the lesion change or become symptomatic. This plan was reviewed with the patient and patient agrees. All questions were answered. This scribe documentation was reviewed by me and accurately reflects the examination and decisions made by me.

## 2018-02-21 RX ORDER — POLYETHYLENE GLYCOL 3350 17 G/17G
17 POWDER, FOR SOLUTION ORAL DAILY
Qty: 30 PACKET | Refills: 5 | Status: SHIPPED | OUTPATIENT
Start: 2018-02-21

## 2018-02-21 RX ORDER — METFORMIN HYDROCHLORIDE 500 MG/1
500 TABLET ORAL 2 TIMES DAILY WITH MEALS
Qty: 180 TAB | Refills: 1 | Status: SHIPPED | OUTPATIENT
Start: 2018-02-21

## 2021-03-06 NOTE — PATIENT INSTRUCTIONS
Self Skin Exam and Sunscreens    Early detection and treatment is essential in the treatment of all forms of skin cancer. If caught early, all forms of skin cancer are curable. In addition to your regular visits, you should perform a monthly skin examination. Over time, you become familiar with what is normally found on your skin and can identify new or suspicious spots. One of the screening tools you can use to assess your skin is to follow the ABCDEs:    A= Asymmetry (One half is unlike the other half)     B= Border (An irregular, scalloped or poorly defined edge)    C= Color (Is varied from one area to another, has shades of tan, brown/ black,       white, red or blue)    D= Diameter (Spots larger than 6mm or a pencil eraser)    E= Evolving (New spots or one that is changing in size, shape, or color)    A follow- up interval will be customized based on your history of skin cancer or level of skin damage and risk factors. In any case, if you notice a suspicious or new spot, an appointment should be arranged between regular visits. Everyone should use sunscreen and sun-safe practices, which is especially important for those with a personal or family history of skin cancer. Suggestions for this include:    1. Use daily moisturizers containing SPF 30 or higher. 2. Wear long sleeve clothing with UPF ratings and a broad-brimmed hat. 3. Apply sunscreen with SPF 30 or higher to all sun exposed areas if you are going to be in the sun. A broad spectrum UVA/ UVB sunscreen is best.  Dont forget to REAPPLY every two hours or more often if swimming or sweating! 4. Avoid outside activities during peak sun hours, especially in the summer (10am- 2pm). 5. DO NOT use tanning beds. Using sunscreen and sun-safe practices can help reduce the likelihood of developing skin cancer or additional skin cancers in those previously diagnosed. 1